# Patient Record
Sex: FEMALE | Race: WHITE | HISPANIC OR LATINO | URBAN - METROPOLITAN AREA
[De-identification: names, ages, dates, MRNs, and addresses within clinical notes are randomized per-mention and may not be internally consistent; named-entity substitution may affect disease eponyms.]

---

## 2018-04-19 ENCOUNTER — OUTPATIENT (OUTPATIENT)
Dept: OUTPATIENT SERVICES | Facility: HOSPITAL | Age: 53
LOS: 1 days | Discharge: HOME | End: 2018-04-19

## 2018-04-19 VITALS
RESPIRATION RATE: 16 BRPM | SYSTOLIC BLOOD PRESSURE: 141 MMHG | TEMPERATURE: 98 F | HEART RATE: 91 BPM | DIASTOLIC BLOOD PRESSURE: 70 MMHG | WEIGHT: 134.48 LBS | OXYGEN SATURATION: 96 %

## 2018-04-19 DIAGNOSIS — M20.11 HALLUX VALGUS (ACQUIRED), RIGHT FOOT: ICD-10-CM

## 2018-04-19 DIAGNOSIS — M21.6X1 OTHER ACQUIRED DEFORMITIES OF RIGHT FOOT: ICD-10-CM

## 2018-04-19 DIAGNOSIS — Z12.11 ENCOUNTER FOR SCREENING FOR MALIGNANT NEOPLASM OF COLON: Chronic | ICD-10-CM

## 2018-04-19 DIAGNOSIS — Z98.890 OTHER SPECIFIED POSTPROCEDURAL STATES: Chronic | ICD-10-CM

## 2018-04-19 DIAGNOSIS — Z01.818 ENCOUNTER FOR OTHER PREPROCEDURAL EXAMINATION: ICD-10-CM

## 2018-04-19 DIAGNOSIS — M21.969 UNSPECIFIED ACQUIRED DEFORMITY OF UNSPECIFIED LOWER LEG: ICD-10-CM

## 2018-04-19 DIAGNOSIS — I25.10 ATHEROSCLEROTIC HEART DISEASE OF NATIVE CORONARY ARTERY WITHOUT ANGINA PECTORIS: Chronic | ICD-10-CM

## 2018-04-19 DIAGNOSIS — Z30.2 ENCOUNTER FOR STERILIZATION: Chronic | ICD-10-CM

## 2018-04-19 DIAGNOSIS — Z87.891 PERSONAL HISTORY OF NICOTINE DEPENDENCE: ICD-10-CM

## 2018-04-19 LAB
ALBUMIN SERPL ELPH-MCNC: 4.9 G/DL — SIGNIFICANT CHANGE UP (ref 3.5–5.2)
ALP SERPL-CCNC: 60 U/L — SIGNIFICANT CHANGE UP (ref 30–115)
ALT FLD-CCNC: 16 U/L — SIGNIFICANT CHANGE UP (ref 0–41)
ANION GAP SERPL CALC-SCNC: 17 MMOL/L — HIGH (ref 7–14)
APPEARANCE UR: CLEAR — SIGNIFICANT CHANGE UP
APTT BLD: 30 SEC — SIGNIFICANT CHANGE UP (ref 27–39.2)
AST SERPL-CCNC: 21 U/L — SIGNIFICANT CHANGE UP (ref 0–41)
BASOPHILS # BLD AUTO: 0.06 K/UL — SIGNIFICANT CHANGE UP (ref 0–0.2)
BASOPHILS NFR BLD AUTO: 0.9 % — SIGNIFICANT CHANGE UP (ref 0–1)
BILIRUB SERPL-MCNC: 0.3 MG/DL — SIGNIFICANT CHANGE UP (ref 0.2–1.2)
BILIRUB UR-MCNC: NEGATIVE — SIGNIFICANT CHANGE UP
BUN SERPL-MCNC: 13 MG/DL — SIGNIFICANT CHANGE UP (ref 10–20)
CALCIUM SERPL-MCNC: 9.9 MG/DL — SIGNIFICANT CHANGE UP (ref 8.5–10.1)
CHLORIDE SERPL-SCNC: 103 MMOL/L — SIGNIFICANT CHANGE UP (ref 98–110)
CO2 SERPL-SCNC: 23 MMOL/L — SIGNIFICANT CHANGE UP (ref 17–32)
COLOR SPEC: YELLOW — SIGNIFICANT CHANGE UP
CREAT SERPL-MCNC: 0.8 MG/DL — SIGNIFICANT CHANGE UP (ref 0.7–1.5)
DIFF PNL FLD: NEGATIVE — SIGNIFICANT CHANGE UP
EOSINOPHIL # BLD AUTO: 0.03 K/UL — SIGNIFICANT CHANGE UP (ref 0–0.7)
EOSINOPHIL NFR BLD AUTO: 0.5 % — SIGNIFICANT CHANGE UP (ref 0–8)
GLUCOSE SERPL-MCNC: 91 MG/DL — SIGNIFICANT CHANGE UP (ref 70–99)
GLUCOSE UR QL: NEGATIVE MG/DL — SIGNIFICANT CHANGE UP
HCT VFR BLD CALC: 39.8 % — SIGNIFICANT CHANGE UP (ref 37–47)
HGB BLD-MCNC: 13.4 G/DL — SIGNIFICANT CHANGE UP (ref 12–16)
IMM GRANULOCYTES NFR BLD AUTO: 0.2 % — SIGNIFICANT CHANGE UP (ref 0.1–0.3)
INR BLD: 1.01 RATIO — SIGNIFICANT CHANGE UP (ref 0.65–1.3)
KETONES UR-MCNC: NEGATIVE — SIGNIFICANT CHANGE UP
LEUKOCYTE ESTERASE UR-ACNC: NEGATIVE — SIGNIFICANT CHANGE UP
LYMPHOCYTES # BLD AUTO: 2.05 K/UL — SIGNIFICANT CHANGE UP (ref 1.2–3.4)
LYMPHOCYTES # BLD AUTO: 31 % — SIGNIFICANT CHANGE UP (ref 20.5–51.1)
MCHC RBC-ENTMCNC: 28.2 PG — SIGNIFICANT CHANGE UP (ref 27–31)
MCHC RBC-ENTMCNC: 33.7 G/DL — SIGNIFICANT CHANGE UP (ref 32–37)
MCV RBC AUTO: 83.8 FL — SIGNIFICANT CHANGE UP (ref 81–99)
MONOCYTES # BLD AUTO: 0.54 K/UL — SIGNIFICANT CHANGE UP (ref 0.1–0.6)
MONOCYTES NFR BLD AUTO: 8.2 % — SIGNIFICANT CHANGE UP (ref 1.7–9.3)
NEUTROPHILS # BLD AUTO: 3.93 K/UL — SIGNIFICANT CHANGE UP (ref 1.4–6.5)
NEUTROPHILS NFR BLD AUTO: 59.2 % — SIGNIFICANT CHANGE UP (ref 42.2–75.2)
NITRITE UR-MCNC: NEGATIVE — SIGNIFICANT CHANGE UP
NRBC # BLD: 0 /100 WBCS — SIGNIFICANT CHANGE UP (ref 0–0)
PH UR: 6.5 — SIGNIFICANT CHANGE UP (ref 5–8)
PLATELET # BLD AUTO: 291 K/UL — SIGNIFICANT CHANGE UP (ref 130–400)
POTASSIUM SERPL-MCNC: 4.6 MMOL/L — SIGNIFICANT CHANGE UP (ref 3.5–5)
POTASSIUM SERPL-SCNC: 4.6 MMOL/L — SIGNIFICANT CHANGE UP (ref 3.5–5)
PROT SERPL-MCNC: 7.4 G/DL — SIGNIFICANT CHANGE UP (ref 6–8)
PROT UR-MCNC: NEGATIVE MG/DL — SIGNIFICANT CHANGE UP
PROTHROM AB SERPL-ACNC: 10.9 SEC — SIGNIFICANT CHANGE UP (ref 9.95–12.87)
RBC # BLD: 4.75 M/UL — SIGNIFICANT CHANGE UP (ref 4.2–5.4)
RBC # FLD: 12.9 % — SIGNIFICANT CHANGE UP (ref 11.5–14.5)
SODIUM SERPL-SCNC: 143 MMOL/L — SIGNIFICANT CHANGE UP (ref 135–146)
SP GR SPEC: 1.01 — SIGNIFICANT CHANGE UP (ref 1.01–1.03)
UROBILINOGEN FLD QL: 0.2 MG/DL — SIGNIFICANT CHANGE UP (ref 0.2–0.2)
WBC # BLD: 6.62 K/UL — SIGNIFICANT CHANGE UP (ref 4.8–10.8)
WBC # FLD AUTO: 6.62 K/UL — SIGNIFICANT CHANGE UP (ref 4.8–10.8)

## 2018-04-19 NOTE — H&P PST ADULT - NSANTHOSAYNRD_GEN_A_CORE
No. SRIRAM screening performed.  STOP BANG Legend: 0-2 = LOW Risk; 3-4 = INTERMEDIATE Risk; 5-8 = HIGH Risk

## 2018-04-19 NOTE — H&P PST ADULT - REASON FOR ADMISSION
52 year old female here for akin bunionectomy right foot, pratik osteotomy with fixation right foot, keilas bunionectomy right foot with fixation, pt has had pain in right foot for approx 10 years, same is getting worse over the last 3 months, pain is increasing at the end of the day, affecting adl's, needs procedure  pt denies cp, sob, garcia or palpitations  pt denies urinary frequency, urgency or burning  ex dena 3 fos or more

## 2018-04-19 NOTE — H&P PST ADULT - PSH
Encounter for screening colonoscopy    Encounter for tubal ligation    H/O varicose vein stripping    History of total abdominal hysterectomy

## 2018-04-19 NOTE — H&P PST ADULT - FAMILY HISTORY
Father  Still living? Unknown  CAD (coronary artery disease), Age at diagnosis: Age Unknown     Mother  Still living? Unknown  CAD (coronary artery disease), Age at diagnosis: Age Unknown     Sibling  Still living? Yes, Estimated age: Age Unknown  CAD (coronary artery disease), Age at diagnosis: 51-60 Father  Still living? Unknown  CAD (coronary artery disease), Age at diagnosis: 61-70     Mother  Still living? No  CAD (coronary artery disease), Age at diagnosis: 51-60     Sibling  Still living? Yes, Estimated age: 61-70  CAD (coronary artery disease), Age at diagnosis: 51-60 Father  Still living? No  CAD (coronary artery disease), Age at diagnosis: 61-70     Mother  Still living? No  CAD (coronary artery disease), Age at diagnosis: 51-60     Sibling  Still living? Yes, Estimated age: 61-70  CAD (coronary artery disease), Age at diagnosis: 51-60

## 2018-04-26 ENCOUNTER — OUTPATIENT (OUTPATIENT)
Dept: OUTPATIENT SERVICES | Facility: HOSPITAL | Age: 53
LOS: 1 days | Discharge: HOME | End: 2018-04-26

## 2018-04-26 DIAGNOSIS — Z98.890 OTHER SPECIFIED POSTPROCEDURAL STATES: Chronic | ICD-10-CM

## 2018-04-26 DIAGNOSIS — Z30.2 ENCOUNTER FOR STERILIZATION: Chronic | ICD-10-CM

## 2018-04-26 DIAGNOSIS — Z12.11 ENCOUNTER FOR SCREENING FOR MALIGNANT NEOPLASM OF COLON: Chronic | ICD-10-CM

## 2018-04-26 DIAGNOSIS — R05 COUGH: ICD-10-CM

## 2018-04-26 NOTE — ASU PATIENT PROFILE, ADULT - PSH
Encounter for screening colonoscopy    Encounter for tubal ligation    H/O varicose vein stripping    History of bladder surgery  bladder lift  History of total abdominal hysterectomy

## 2018-04-27 ENCOUNTER — OUTPATIENT (OUTPATIENT)
Dept: OUTPATIENT SERVICES | Facility: HOSPITAL | Age: 53
LOS: 1 days | Discharge: HOME | End: 2018-04-27

## 2018-04-27 ENCOUNTER — RESULT REVIEW (OUTPATIENT)
Age: 53
End: 2018-04-27

## 2018-04-27 VITALS
WEIGHT: 134.92 LBS | HEIGHT: 62 IN | TEMPERATURE: 96 F | SYSTOLIC BLOOD PRESSURE: 111 MMHG | DIASTOLIC BLOOD PRESSURE: 72 MMHG | RESPIRATION RATE: 15 BRPM | OXYGEN SATURATION: 98 % | HEART RATE: 72 BPM

## 2018-04-27 VITALS — HEART RATE: 67 BPM | SYSTOLIC BLOOD PRESSURE: 110 MMHG | DIASTOLIC BLOOD PRESSURE: 67 MMHG | RESPIRATION RATE: 18 BRPM

## 2018-04-27 DIAGNOSIS — Z98.890 OTHER SPECIFIED POSTPROCEDURAL STATES: Chronic | ICD-10-CM

## 2018-04-27 DIAGNOSIS — Z12.11 ENCOUNTER FOR SCREENING FOR MALIGNANT NEOPLASM OF COLON: Chronic | ICD-10-CM

## 2018-04-27 DIAGNOSIS — Z30.2 ENCOUNTER FOR STERILIZATION: Chronic | ICD-10-CM

## 2018-04-27 RX ORDER — SODIUM CHLORIDE 9 MG/ML
1000 INJECTION, SOLUTION INTRAVENOUS
Qty: 0 | Refills: 0 | Status: DISCONTINUED | OUTPATIENT
Start: 2018-04-27 | End: 2018-05-12

## 2018-04-27 RX ORDER — MORPHINE SULFATE 50 MG/1
2 CAPSULE, EXTENDED RELEASE ORAL
Qty: 0 | Refills: 0 | Status: DISCONTINUED | OUTPATIENT
Start: 2018-04-27 | End: 2018-04-27

## 2018-04-27 RX ORDER — OXYCODONE AND ACETAMINOPHEN 5; 325 MG/1; MG/1
1 TABLET ORAL EVERY 4 HOURS
Qty: 0 | Refills: 0 | Status: DISCONTINUED | OUTPATIENT
Start: 2018-04-27 | End: 2018-04-27

## 2018-04-27 RX ORDER — ONDANSETRON 8 MG/1
4 TABLET, FILM COATED ORAL ONCE
Qty: 0 | Refills: 0 | Status: DISCONTINUED | OUTPATIENT
Start: 2018-04-27 | End: 2018-05-12

## 2018-04-27 RX ADMIN — SODIUM CHLORIDE 100 MILLILITER(S): 9 INJECTION, SOLUTION INTRAVENOUS at 16:58

## 2018-04-27 NOTE — PROGRESS NOTE ADULT - SUBJECTIVE AND OBJECTIVE BOX
Podiatry Pre-Operative Note     [id number] CLARITZA ROTH is a 52y year old Female patient presenting to the operating room for surgical management of the Right foot. The patient has failed all conservative measures and requires surgical intervention at this time.    PAST MEDICAL & SURGICAL HISTORY:  Bunion of right foot  History of bladder surgery: bladder lift  Encounter for screening colonoscopy  Encounter for tubal ligation  History of total abdominal hysterectomy  H/O varicose vein stripping    Medications:   Denies    Allergies  No Known Allergies    Consent [Done]  H&P [Done PAST]  Medical Clearance [PAST]    T(C): 35.6 (04-27-18 @ 12:36), Max: 35.6 (04-27-18 @ 12:36)  HR: 72 (04-27-18 @ 12:36) (72 - 72)  BP: 111/72 (04-27-18 @ 12:36) (111/72 - 111/72)  RR: 15 (04-27-18 @ 12:36) (15 - 15)  SpO2: 98% (04-27-18 @ 12:36) (98% - 98%)    Surgeon: Dr. Rodney Fletcher DPM  Assistant (s): Dr. Cristian Graham DPM  Pre Operative Diagnosis: Painful Right foot bunion and Right foot bunionette   Planned Procedure: Right foot Danny bunionectomy Right 1st toe Akin osteotmy, Right foot bunionectomy with internal fixation     The patient has been educated on the above procedure, with all risks and benefits described. No guarantees were given or implied for the aforementioned procedure.  The above assistant(s) have introduced themselves to the patient. The patient consents to their participation in the procedure listed above.   04-27-18 @ 13:12

## 2018-04-27 NOTE — BRIEF OPERATIVE NOTE - PROCEDURE
<<-----Click on this checkbox to enter Procedure Osteotomy  04/27/2018  Homman osteotomy Right 5th toe 0.45 kwire left  Active  TCONTRERAS  Akin bunionectomy of right great toe  04/27/2018    Active  TCONTRERAS  Danny bunionectomy of right great toe  04/27/2018  K wire .62 left in place  Active  TCONTRERAS

## 2018-04-27 NOTE — BRIEF OPERATIVE NOTE - POST-OP DX
Bunion of right foot  04/27/2018    Active  Virgie Flores  Bunionette of right foot  04/27/2018    Active  Virgie Flores  Hallux interphalangeus, acquired, right  04/27/2018  primus elevatus , medially deviated  Active  Virgie Flores

## 2018-04-27 NOTE — ASU DISCHARGE PLAN (ADULT/PEDIATRIC). - NOTIFY
Pain not relieved by Medications/Swelling that continues/Numbness, color, or temperature change to extremity/Fever greater than 101/Numbness, tingling/Bleeding that does not stop

## 2018-05-03 DIAGNOSIS — M21.611 BUNION OF RIGHT FOOT: ICD-10-CM

## 2018-05-03 DIAGNOSIS — M21.621 BUNIONETTE OF RIGHT FOOT: ICD-10-CM

## 2018-05-03 DIAGNOSIS — F17.210 NICOTINE DEPENDENCE, CIGARETTES, UNCOMPLICATED: ICD-10-CM

## 2018-05-03 DIAGNOSIS — Z82.49 FAMILY HISTORY OF ISCHEMIC HEART DISEASE AND OTHER DISEASES OF THE CIRCULATORY SYSTEM: ICD-10-CM

## 2018-05-03 DIAGNOSIS — Z90.710 ACQUIRED ABSENCE OF BOTH CERVIX AND UTERUS: ICD-10-CM

## 2018-05-03 LAB — SURGICAL PATHOLOGY STUDY: SIGNIFICANT CHANGE UP

## 2018-07-20 PROBLEM — M21.611 BUNION OF RIGHT FOOT: Chronic | Status: ACTIVE | Noted: 2018-04-19

## 2018-07-26 ENCOUNTER — OUTPATIENT (OUTPATIENT)
Dept: OUTPATIENT SERVICES | Facility: HOSPITAL | Age: 53
LOS: 1 days | Discharge: HOME | End: 2018-07-26

## 2018-07-26 DIAGNOSIS — Z98.890 OTHER SPECIFIED POSTPROCEDURAL STATES: Chronic | ICD-10-CM

## 2018-07-26 DIAGNOSIS — I25.89 OTHER FORMS OF CHRONIC ISCHEMIC HEART DISEASE: ICD-10-CM

## 2018-07-26 DIAGNOSIS — Z12.11 ENCOUNTER FOR SCREENING FOR MALIGNANT NEOPLASM OF COLON: Chronic | ICD-10-CM

## 2018-07-26 DIAGNOSIS — Z30.2 ENCOUNTER FOR STERILIZATION: Chronic | ICD-10-CM

## 2019-09-27 ENCOUNTER — OUTPATIENT (OUTPATIENT)
Dept: OUTPATIENT SERVICES | Facility: HOSPITAL | Age: 54
LOS: 1 days | Discharge: HOME | End: 2019-09-27
Payer: COMMERCIAL

## 2019-09-27 VITALS
HEIGHT: 62 IN | WEIGHT: 139.99 LBS | DIASTOLIC BLOOD PRESSURE: 83 MMHG | RESPIRATION RATE: 18 BRPM | SYSTOLIC BLOOD PRESSURE: 140 MMHG | HEART RATE: 88 BPM | TEMPERATURE: 98 F

## 2019-09-27 DIAGNOSIS — L02.611 CUTANEOUS ABSCESS OF RIGHT FOOT: ICD-10-CM

## 2019-09-27 DIAGNOSIS — M20.12 HALLUX VALGUS (ACQUIRED), LEFT FOOT: ICD-10-CM

## 2019-09-27 DIAGNOSIS — Z98.890 OTHER SPECIFIED POSTPROCEDURAL STATES: Chronic | ICD-10-CM

## 2019-09-27 DIAGNOSIS — Z30.2 ENCOUNTER FOR STERILIZATION: Chronic | ICD-10-CM

## 2019-09-27 DIAGNOSIS — Z12.11 ENCOUNTER FOR SCREENING FOR MALIGNANT NEOPLASM OF COLON: Chronic | ICD-10-CM

## 2019-09-27 DIAGNOSIS — Z01.818 ENCOUNTER FOR OTHER PREPROCEDURAL EXAMINATION: ICD-10-CM

## 2019-09-27 DIAGNOSIS — T85.79XA INFECTION AND INFLAMMATORY REACTION DUE TO OTHER INTERNAL PROSTHETIC DEVICES, IMPLANTS AND GRAFTS, INITIAL ENCOUNTER: ICD-10-CM

## 2019-09-27 LAB
ALBUMIN SERPL ELPH-MCNC: 5.2 G/DL — SIGNIFICANT CHANGE UP (ref 3.5–5.2)
ALP SERPL-CCNC: 68 U/L — SIGNIFICANT CHANGE UP (ref 30–115)
ALT FLD-CCNC: 16 U/L — SIGNIFICANT CHANGE UP (ref 0–41)
ANION GAP SERPL CALC-SCNC: 13 MMOL/L — SIGNIFICANT CHANGE UP (ref 7–14)
APPEARANCE UR: CLEAR — SIGNIFICANT CHANGE UP
APTT BLD: 35.4 SEC — SIGNIFICANT CHANGE UP (ref 27–39.2)
AST SERPL-CCNC: 22 U/L — SIGNIFICANT CHANGE UP (ref 0–41)
BASOPHILS # BLD AUTO: 0.06 K/UL — SIGNIFICANT CHANGE UP (ref 0–0.2)
BASOPHILS NFR BLD AUTO: 0.9 % — SIGNIFICANT CHANGE UP (ref 0–1)
BILIRUB SERPL-MCNC: 0.3 MG/DL — SIGNIFICANT CHANGE UP (ref 0.2–1.2)
BILIRUB UR-MCNC: NEGATIVE — SIGNIFICANT CHANGE UP
BUN SERPL-MCNC: 16 MG/DL — SIGNIFICANT CHANGE UP (ref 10–20)
CALCIUM SERPL-MCNC: 9.8 MG/DL — SIGNIFICANT CHANGE UP (ref 8.5–10.1)
CHLORIDE SERPL-SCNC: 103 MMOL/L — SIGNIFICANT CHANGE UP (ref 98–110)
CO2 SERPL-SCNC: 27 MMOL/L — SIGNIFICANT CHANGE UP (ref 17–32)
COLOR SPEC: SIGNIFICANT CHANGE UP
CREAT SERPL-MCNC: 0.8 MG/DL — SIGNIFICANT CHANGE UP (ref 0.7–1.5)
DIFF PNL FLD: NEGATIVE — SIGNIFICANT CHANGE UP
EOSINOPHIL # BLD AUTO: 0.05 K/UL — SIGNIFICANT CHANGE UP (ref 0–0.7)
EOSINOPHIL NFR BLD AUTO: 0.8 % — SIGNIFICANT CHANGE UP (ref 0–8)
GLUCOSE SERPL-MCNC: 85 MG/DL — SIGNIFICANT CHANGE UP (ref 70–99)
GLUCOSE UR QL: NEGATIVE — SIGNIFICANT CHANGE UP
HCT VFR BLD CALC: 43.3 % — SIGNIFICANT CHANGE UP (ref 37–47)
HGB BLD-MCNC: 13.8 G/DL — SIGNIFICANT CHANGE UP (ref 12–16)
IMM GRANULOCYTES NFR BLD AUTO: 0.2 % — SIGNIFICANT CHANGE UP (ref 0.1–0.3)
INR BLD: 0.91 RATIO — SIGNIFICANT CHANGE UP (ref 0.65–1.3)
KETONES UR-MCNC: NEGATIVE — SIGNIFICANT CHANGE UP
LEUKOCYTE ESTERASE UR-ACNC: NEGATIVE — SIGNIFICANT CHANGE UP
LYMPHOCYTES # BLD AUTO: 2.15 K/UL — SIGNIFICANT CHANGE UP (ref 1.2–3.4)
LYMPHOCYTES # BLD AUTO: 32.8 % — SIGNIFICANT CHANGE UP (ref 20.5–51.1)
MCHC RBC-ENTMCNC: 28.3 PG — SIGNIFICANT CHANGE UP (ref 27–31)
MCHC RBC-ENTMCNC: 31.9 G/DL — LOW (ref 32–37)
MCV RBC AUTO: 88.9 FL — SIGNIFICANT CHANGE UP (ref 81–99)
MONOCYTES # BLD AUTO: 0.57 K/UL — SIGNIFICANT CHANGE UP (ref 0.1–0.6)
MONOCYTES NFR BLD AUTO: 8.7 % — SIGNIFICANT CHANGE UP (ref 1.7–9.3)
NEUTROPHILS # BLD AUTO: 3.71 K/UL — SIGNIFICANT CHANGE UP (ref 1.4–6.5)
NEUTROPHILS NFR BLD AUTO: 56.6 % — SIGNIFICANT CHANGE UP (ref 42.2–75.2)
NITRITE UR-MCNC: NEGATIVE — SIGNIFICANT CHANGE UP
NRBC # BLD: 0 /100 WBCS — SIGNIFICANT CHANGE UP (ref 0–0)
PH UR: 7.5 — SIGNIFICANT CHANGE UP (ref 5–8)
PLATELET # BLD AUTO: 289 K/UL — SIGNIFICANT CHANGE UP (ref 130–400)
POTASSIUM SERPL-MCNC: 4.3 MMOL/L — SIGNIFICANT CHANGE UP (ref 3.5–5)
POTASSIUM SERPL-SCNC: 4.3 MMOL/L — SIGNIFICANT CHANGE UP (ref 3.5–5)
PROT SERPL-MCNC: 7.8 G/DL — SIGNIFICANT CHANGE UP (ref 6–8)
PROT UR-MCNC: NEGATIVE — SIGNIFICANT CHANGE UP
PROTHROM AB SERPL-ACNC: 10.5 SEC — SIGNIFICANT CHANGE UP (ref 9.95–12.87)
RBC # BLD: 4.87 M/UL — SIGNIFICANT CHANGE UP (ref 4.2–5.4)
RBC # FLD: 12.8 % — SIGNIFICANT CHANGE UP (ref 11.5–14.5)
SODIUM SERPL-SCNC: 143 MMOL/L — SIGNIFICANT CHANGE UP (ref 135–146)
SP GR SPEC: 1.01 — LOW (ref 1.01–1.02)
UROBILINOGEN FLD QL: SIGNIFICANT CHANGE UP
WBC # BLD: 6.55 K/UL — SIGNIFICANT CHANGE UP (ref 4.8–10.8)
WBC # FLD AUTO: 6.55 K/UL — SIGNIFICANT CHANGE UP (ref 4.8–10.8)

## 2019-09-27 PROCEDURE — 93010 ELECTROCARDIOGRAM REPORT: CPT

## 2019-09-27 PROCEDURE — 71046 X-RAY EXAM CHEST 2 VIEWS: CPT | Mod: 26

## 2019-09-27 NOTE — H&P PST ADULT - NSICDXFAMILYHX_GEN_ALL_CORE_FT
FAMILY HISTORY:  Father  Still living? No  CAD (coronary artery disease), Age at diagnosis: 61-70    Mother  Still living? No  CAD (coronary artery disease), Age at diagnosis: 51-60    Sibling  Still living? Yes, Estimated age: 61-70  CAD (coronary artery disease), Age at diagnosis: 51-60

## 2019-09-27 NOTE — H&P PST ADULT - HISTORY OF PRESENT ILLNESS
Patient presents with c/o progressively worsening left bunion pain and right ORIF site pain. Patient denies any cp, sob, palpitations fever, cough or dysuria. Ex tolerance of 5 FOS walk with out SOB. No SRIRAM.

## 2019-09-27 NOTE — H&P PST ADULT - NSICDXPASTSURGICALHX_GEN_ALL_CORE_FT
PAST SURGICAL HISTORY:  Encounter for screening colonoscopy     Encounter for tubal ligation     H/O varicose vein stripping     History of bladder surgery bladder lift    History of total abdominal hysterectomy

## 2019-09-29 LAB
CULTURE RESULTS: SIGNIFICANT CHANGE UP
SPECIMEN SOURCE: SIGNIFICANT CHANGE UP

## 2019-10-01 PROBLEM — M19.90 UNSPECIFIED OSTEOARTHRITIS, UNSPECIFIED SITE: Chronic | Status: ACTIVE | Noted: 2019-09-27

## 2019-10-10 NOTE — ASU PATIENT PROFILE, ADULT - PSH
Encounter for screening colonoscopy  2015  Encounter for tubal ligation    H/O varicose vein stripping    History of bladder surgery  bladder lift  History of total abdominal hysterectomy

## 2019-10-11 ENCOUNTER — RESULT REVIEW (OUTPATIENT)
Age: 54
End: 2019-10-11

## 2019-10-11 ENCOUNTER — OUTPATIENT (OUTPATIENT)
Dept: OUTPATIENT SERVICES | Facility: HOSPITAL | Age: 54
LOS: 1 days | Discharge: HOME | End: 2019-10-11
Payer: COMMERCIAL

## 2019-10-11 VITALS — DIASTOLIC BLOOD PRESSURE: 64 MMHG | RESPIRATION RATE: 18 BRPM | SYSTOLIC BLOOD PRESSURE: 134 MMHG | HEART RATE: 64 BPM

## 2019-10-11 VITALS
RESPIRATION RATE: 18 BRPM | SYSTOLIC BLOOD PRESSURE: 118 MMHG | HEIGHT: 62 IN | DIASTOLIC BLOOD PRESSURE: 61 MMHG | OXYGEN SATURATION: 97 % | HEART RATE: 70 BPM | WEIGHT: 138.01 LBS | TEMPERATURE: 97 F

## 2019-10-11 DIAGNOSIS — Z30.2 ENCOUNTER FOR STERILIZATION: Chronic | ICD-10-CM

## 2019-10-11 DIAGNOSIS — Z12.11 ENCOUNTER FOR SCREENING FOR MALIGNANT NEOPLASM OF COLON: Chronic | ICD-10-CM

## 2019-10-11 DIAGNOSIS — M19.90 UNSPECIFIED OSTEOARTHRITIS, UNSPECIFIED SITE: ICD-10-CM

## 2019-10-11 DIAGNOSIS — M20.12 HALLUX VALGUS (ACQUIRED), LEFT FOOT: ICD-10-CM

## 2019-10-11 DIAGNOSIS — Z98.890 OTHER SPECIFIED POSTPROCEDURAL STATES: Chronic | ICD-10-CM

## 2019-10-11 DIAGNOSIS — L03.031 CELLULITIS OF RIGHT TOE: ICD-10-CM

## 2019-10-11 PROCEDURE — 88311 DECALCIFY TISSUE: CPT | Mod: 26

## 2019-10-11 PROCEDURE — 88300 SURGICAL PATH GROSS: CPT | Mod: 26,59

## 2019-10-11 PROCEDURE — 88304 TISSUE EXAM BY PATHOLOGIST: CPT | Mod: 26

## 2019-10-11 RX ORDER — OXYCODONE AND ACETAMINOPHEN 5; 325 MG/1; MG/1
1 TABLET ORAL EVERY 4 HOURS
Refills: 0 | Status: DISCONTINUED | OUTPATIENT
Start: 2019-10-11 | End: 2019-10-11

## 2019-10-11 RX ORDER — SODIUM CHLORIDE 9 MG/ML
1000 INJECTION, SOLUTION INTRAVENOUS
Refills: 0 | Status: DISCONTINUED | OUTPATIENT
Start: 2019-10-11 | End: 2019-10-28

## 2019-10-11 RX ORDER — ONDANSETRON 8 MG/1
4 TABLET, FILM COATED ORAL ONCE
Refills: 0 | Status: DISCONTINUED | OUTPATIENT
Start: 2019-10-11 | End: 2019-10-28

## 2019-10-11 RX ORDER — ACETAMINOPHEN 500 MG
650 TABLET ORAL ONCE
Refills: 0 | Status: DISCONTINUED | OUTPATIENT
Start: 2019-10-11 | End: 2019-10-28

## 2019-10-11 RX ORDER — MORPHINE SULFATE 50 MG/1
2 CAPSULE, EXTENDED RELEASE ORAL
Refills: 0 | Status: DISCONTINUED | OUTPATIENT
Start: 2019-10-11 | End: 2019-10-11

## 2019-10-11 RX ADMIN — SODIUM CHLORIDE 100 MILLILITER(S): 9 INJECTION, SOLUTION INTRAVENOUS at 16:33

## 2019-10-11 NOTE — ASU DISCHARGE PLAN (ADULT/PEDIATRIC) - CALL YOUR DOCTOR IF YOU HAVE ANY OF THE FOLLOWING:
Fever greater than (need to indicate Fahrenheit or Celsius)/Wound/Surgical Site with redness, or foul smelling discharge or pus/Excessive diarrhea/Swelling that gets worse/Increased irritability or sluggishness/100.4/Nausea and vomiting that does not stop/Unable to urinate/Inability to tolerate liquids or foods/Bleeding that does not stop/Pain not relieved by Medications/Numbness, tingling, color or temperature change to extremity

## 2019-10-11 NOTE — ASU DISCHARGE PLAN (ADULT/PEDIATRIC) - PROCEDURE
Danny bunionectomy and proximal Moises left foot. Removel of hardware and partial matrixectomy medial border right hallux nail right foot

## 2019-10-11 NOTE — ASU PREOP CHECKLIST - SKIN PREP
Pt presents to ED c/o HA, n/v, and severe abd cramping since Friday.  States she took zofran, but it isn't helping.  Denies sob, chest pains.  Also states she thinks she may be peeing blood, but not sure.     n/a

## 2019-10-11 NOTE — BRIEF OPERATIVE NOTE - NSICDXBRIEFPROCEDURE_GEN_ALL_CORE_FT
PROCEDURES:  Removal of hardware from metatarsal bone of right foot 11-Oct-2019 16:16:58  Perfecto Quinonez  Moises osteotomy 11-Oct-2019 16:16:39  Perfecto Quinonez bunionectomy 11-Oct-2019 16:16:24  Perfecto Quinonez

## 2019-10-15 LAB — SURGICAL PATHOLOGY STUDY: SIGNIFICANT CHANGE UP

## 2020-06-26 PROBLEM — G43.909 MIGRAINE, UNSPECIFIED, NOT INTRACTABLE, WITHOUT STATUS MIGRAINOSUS: Chronic | Status: ACTIVE | Noted: 2019-10-11

## 2020-07-22 PROBLEM — Z00.00 ENCOUNTER FOR PREVENTIVE HEALTH EXAMINATION: Status: ACTIVE | Noted: 2020-07-22

## 2020-07-24 ENCOUNTER — RESULT CHARGE (OUTPATIENT)
Age: 55
End: 2020-07-24

## 2020-07-24 ENCOUNTER — OUTPATIENT (OUTPATIENT)
Dept: OUTPATIENT SERVICES | Facility: HOSPITAL | Age: 55
LOS: 1 days | Discharge: HOME | End: 2020-07-24

## 2020-07-24 ENCOUNTER — APPOINTMENT (OUTPATIENT)
Dept: UROGYNECOLOGY | Facility: CLINIC | Age: 55
End: 2020-07-24
Payer: COMMERCIAL

## 2020-07-24 VITALS
BODY MASS INDEX: 26.68 KG/M2 | HEIGHT: 62 IN | DIASTOLIC BLOOD PRESSURE: 78 MMHG | WEIGHT: 145 LBS | SYSTOLIC BLOOD PRESSURE: 126 MMHG

## 2020-07-24 DIAGNOSIS — Z30.2 ENCOUNTER FOR STERILIZATION: Chronic | ICD-10-CM

## 2020-07-24 DIAGNOSIS — Z87.440 PERSONAL HISTORY OF URINARY (TRACT) INFECTIONS: ICD-10-CM

## 2020-07-24 DIAGNOSIS — Z78.9 OTHER SPECIFIED HEALTH STATUS: ICD-10-CM

## 2020-07-24 DIAGNOSIS — Z82.49 FAMILY HISTORY OF ISCHEMIC HEART DISEASE AND OTHER DISEASES OF THE CIRCULATORY SYSTEM: ICD-10-CM

## 2020-07-24 DIAGNOSIS — Z12.11 ENCOUNTER FOR SCREENING FOR MALIGNANT NEOPLASM OF COLON: Chronic | ICD-10-CM

## 2020-07-24 DIAGNOSIS — Z98.890 OTHER SPECIFIED POSTPROCEDURAL STATES: Chronic | ICD-10-CM

## 2020-07-24 DIAGNOSIS — F17.200 NICOTINE DEPENDENCE, UNSPECIFIED, UNCOMPLICATED: ICD-10-CM

## 2020-07-24 LAB
BILIRUB UR QL STRIP: NORMAL
CLARITY UR: CLEAR
COLLECTION METHOD: NORMAL
GLUCOSE UR-MCNC: NORMAL
HCG UR QL: 0.2 EU/DL
HGB UR QL STRIP.AUTO: NORMAL
KETONES UR-MCNC: NORMAL
LEUKOCYTE ESTERASE UR QL STRIP: NORMAL
NITRITE UR QL STRIP: NORMAL
PH UR STRIP: 6
PROT UR STRIP-MCNC: NORMAL
SP GR UR STRIP: 1

## 2020-07-24 PROCEDURE — 99244 OFF/OP CNSLTJ NEW/EST MOD 40: CPT | Mod: 25

## 2020-07-24 PROCEDURE — 51701 INSERT BLADDER CATHETER: CPT

## 2020-07-27 LAB
APPEARANCE: CLEAR
BACTERIA UR CULT: NORMAL
BILIRUBIN URINE: NEGATIVE
BLOOD URINE: NEGATIVE
COLOR: COLORLESS
GLUCOSE QUALITATIVE U: NEGATIVE
KETONES URINE: NEGATIVE
LEUKOCYTE ESTERASE URINE: NEGATIVE
NITRITE URINE: NEGATIVE
PH URINE: 6.5
PROTEIN URINE: NEGATIVE
SPECIFIC GRAVITY URINE: 1
UROBILINOGEN URINE: NORMAL

## 2020-07-29 DIAGNOSIS — N39.46 MIXED INCONTINENCE: ICD-10-CM

## 2020-07-29 DIAGNOSIS — Z87.440 PERSONAL HISTORY OF URINARY (TRACT) INFECTIONS: ICD-10-CM

## 2020-07-29 DIAGNOSIS — N95.2 POSTMENOPAUSAL ATROPHIC VAGINITIS: ICD-10-CM

## 2020-08-21 NOTE — HISTORY OF PRESENT ILLNESS
[FreeTextEntry1] : \par Pt with pelvic floor dysfunction here for urogynecologic evaluation. She describes: \par Referring provider: Dr Maldonado\par \par Chief PFD: urinary incontinence\par \par Reports symptoms of frequency, dysuria, intermittent hematuria, has a urine dip, no cultures, last time was 3 months ago, reports 4 episodes in the last 12 months\par \par Operative report reviewed:\par 12/27/16 Dr Weaver: anterior colporrhaphy (but is not described in the procedure paragraph)/TVT/cysto with hydrodistention (tensioned with crede and full bladder)\par 1/17/17 Dr Weaver: revision of anterior colporrhaphy (but is not described in the procedure paragraph)/transection of sling,urethrolysis\par \par Pelvic organ prolapse: s/p tubal, s/p laparoscopic hysterectomy(endometriosis) 2015, no bulge, denies splinting\par Stress urinary incontinence: s/p sling, postop guerra for 3 weeks, Dr Miner, s/p ? transection, currently incontinence with coughing, sneezing, laughing, for 4 years, FOREST: S=U\par Overactive bladder syndrome: voids q1 hour secondary to pain that improves with urination, urgency, no eneuresis, urge incontinence daily, for 4 years, no prior treatment, no glaucoma\par Voiding dysfunction: no Incomplete bladder emptying, no hesitancy \par Lower urinary tract/vaginal symptoms: as above UTIs per year, as above hematuria, no dysuria, as above bladder pain \par Fecal incontinence: denies\par Defecatory dysfunction: sausage\par Sexual dysfunction: pain at the opening for the last couple of years\par Pelvic pain: denies\par Vaginal dryness : denies\par \par Her pelvic floor symptoms are significantly bothersome and negatively impacting her quality of life. \par \par

## 2020-08-21 NOTE — COUNSELING
[FreeTextEntry1] : \par We will notify you of the urine results if they are abnormal\par \par Please call the office if you feel like you have an infection so that we can arrange testing of your urine. If you keep getting infections then I will recommend further evaluation of your kidneys\par \par Please call the pharmacy to discuss returning the myrbetriq. Please let us know what they say. If you can't return it, then please start the myrbetriq daily. \par \par Apply a pea size amount of the cream to the opening of the vagina every night for two weeks followed by three nights per week\par \par Please call my office if you have any issues with the cost or side effects of the medication.\par \par Please schedule a cysto with Dr Ramirez in 4-6 weeks (FOREST). Please come with a full bladder\par \par Please schedule a pessary fitting with my PALedy\par \par Referral to pelvic floor PT\par \par [facility]\par [address] [place] [address] [address] [facility] [id number]\par \par \par Please sign a medical release form at the  so that I can get your operative reports from [facility]\par \par \par

## 2020-08-21 NOTE — DISCUSSION/SUMMARY
[FreeTextEntry1] : \par Mixed incontinence-\par The pathophysiology of the above condition was discussed with the patient. The patient was given information and education on pelvic floor muscle exercises/rehabilitation, avoidance of dietary bladder irritants, and other strategies to improve bladder control, such as scheduled voiding. She was counseled regarding further management strategies for overactive bladder and urge incontinence including pelvic floor physical therapy, medications or surgical management. The patient voiced understanding and agrees with diet changes, referral to PT, further evaluation for foreign body with a cysto and medical management. The patient already picked up the myrbetriq 50mg (prescribed by other provider). It cost 100 dollars. She will try to return it but if they won't accept it she will use it for the month (30 day supply) and then switch to trospium 20mg bid. We will follow up on her urine tests.\par \par he management options for stress incontinence were discussed including observation, pessary placement, pelvic floor physical therapy or surgery. The patient voiced understanding and agrees with a referral to PT and pessary management. She will be scheduled for a pessary fitting.\par \par History of UTI-\par Advised the patient that recurrent UTIs are defined as having 3 or more positive urine culture in 1 year or 2 or more in 6 months, which she has not had. Advised to call the office if she feels like she has an infection so that we can arrange testing of her urine. If she keeps getting infections then I will recommend a workup of further evaluation of her kidneys and bladder and further prevention treatment including estrogen vaginal cream and daily antibiotic suppression. The patient voiced understanding and agrees with the plan.\par \par Atrophic vaginitis-\par We reviewed the risks, benefits, alternatives and indications of local estrogen therapy and I gave her a handout that covers this information. She does opt to begin this therapy. I have given her a prescription and specific instructions on how to use the estrogen cream, applied with a finger at a low dose for urogenital atrophy.\par \par \par

## 2020-08-21 NOTE — PHYSICAL EXAM
[Chaperone Present] : A chaperone was present in the examining room during all aspects of the physical examination [FreeTextEntry1] : Void: 350 cc\par PVR: 110 cc (normal PVR for this volume voided is 153cc or less)\par Urethra was prepped in sterile fashion and then a sterile catheter was used by me to drain the bladder.\par \par  Ap: -1  Bp: -1, with hard stools\par \par Well healed incision: laparoscopic \par normal perineal sensation\par normal perineal reflexes\par positive cough stress test\par positive atrophy\par positive urethral hypermobility\par bilateral levator ani spasm,  no tenderness\par no urethral tenderness\par no bladder tenderness\par no cuff tenderness\par surgically absent uterus and cervix\par No Mesh exposure or tenderness \par 1/5 Kegel\par

## 2020-09-04 ENCOUNTER — APPOINTMENT (OUTPATIENT)
Dept: UROGYNECOLOGY | Facility: CLINIC | Age: 55
End: 2020-09-04
Payer: COMMERCIAL

## 2020-09-04 ENCOUNTER — OUTPATIENT (OUTPATIENT)
Dept: OUTPATIENT SERVICES | Facility: HOSPITAL | Age: 55
LOS: 1 days | Discharge: HOME | End: 2020-09-04

## 2020-09-04 VITALS — DIASTOLIC BLOOD PRESSURE: 70 MMHG | SYSTOLIC BLOOD PRESSURE: 130 MMHG

## 2020-09-04 DIAGNOSIS — Z30.2 ENCOUNTER FOR STERILIZATION: Chronic | ICD-10-CM

## 2020-09-04 DIAGNOSIS — Z98.890 OTHER SPECIFIED POSTPROCEDURAL STATES: Chronic | ICD-10-CM

## 2020-09-04 DIAGNOSIS — Z12.11 ENCOUNTER FOR SCREENING FOR MALIGNANT NEOPLASM OF COLON: Chronic | ICD-10-CM

## 2020-09-04 PROCEDURE — 52000 CYSTOURETHROSCOPY: CPT

## 2020-09-04 RX ORDER — ESTRADIOL 0.1 MG/G
0.1 CREAM VAGINAL
Qty: 1 | Refills: 3 | Status: DISCONTINUED | COMMUNITY
Start: 2020-07-24 | End: 2020-09-04

## 2020-09-23 ENCOUNTER — APPOINTMENT (OUTPATIENT)
Dept: UROGYNECOLOGY | Facility: CLINIC | Age: 55
End: 2020-09-23

## 2020-09-30 RX ORDER — ESTRADIOL 0.1 MG/G
0.1 CREAM VAGINAL
Qty: 1 | Refills: 3 | Status: ACTIVE | COMMUNITY
Start: 2020-09-04 | End: 1900-01-01

## 2020-10-08 NOTE — ASU PREOP CHECKLIST - TEMPERATURE IN CELSIUS (DEGREES C)
Subjective:       Patient ID: Jane Lemus is a 48 y.o. female.    Chief Complaint: No chief complaint on file.    Flu shot given to left deltoid.   NKDA.   No food allergies.       ROS     Objective:      Physical Exam    Assessment:       No diagnosis found.    Plan:                   No follow-ups on file.      
35.6

## 2020-10-16 ENCOUNTER — APPOINTMENT (OUTPATIENT)
Dept: UROGYNECOLOGY | Facility: CLINIC | Age: 55
End: 2020-10-16
Payer: COMMERCIAL

## 2020-10-16 ENCOUNTER — OUTPATIENT (OUTPATIENT)
Dept: OUTPATIENT SERVICES | Facility: HOSPITAL | Age: 55
LOS: 1 days | Discharge: HOME | End: 2020-10-16

## 2020-10-16 VITALS — HEIGHT: 62 IN | SYSTOLIC BLOOD PRESSURE: 116 MMHG | DIASTOLIC BLOOD PRESSURE: 70 MMHG

## 2020-10-16 DIAGNOSIS — Z30.2 ENCOUNTER FOR STERILIZATION: Chronic | ICD-10-CM

## 2020-10-16 DIAGNOSIS — Z98.890 OTHER SPECIFIED POSTPROCEDURAL STATES: Chronic | ICD-10-CM

## 2020-10-16 DIAGNOSIS — N95.2 POSTMENOPAUSAL ATROPHIC VAGINITIS: ICD-10-CM

## 2020-10-16 DIAGNOSIS — Z12.11 ENCOUNTER FOR SCREENING FOR MALIGNANT NEOPLASM OF COLON: Chronic | ICD-10-CM

## 2020-10-16 PROCEDURE — 57160 INSERT PESSARY/OTHER DEVICE: CPT

## 2020-10-16 RX ORDER — TROSPIUM CHLORIDE 20 MG/1
20 TABLET, FILM COATED ORAL
Qty: 180 | Refills: 0 | Status: DISCONTINUED | COMMUNITY
Start: 2020-09-04 | End: 2020-10-16

## 2020-10-16 RX ORDER — SOLIFENACIN SUCCINATE 5 MG/1
5 TABLET ORAL
Qty: 30 | Refills: 1 | Status: ACTIVE | COMMUNITY
Start: 2020-10-16 | End: 1900-01-01

## 2020-10-16 NOTE — COUNSELING
[FreeTextEntry1] : If you feel like you have an infection it is important for you to call our office and we will arrange testing of your urine.\par \par Please begin taking Vesicare 5mg once a day. It takes up to 6 weeks to go into full effect. Please  your refill when you complete the 1st bottle.\par \par Please continue to apply a pea size amount to the opening of the vagina three times a week. \par \par Schedule a 6 weeks follow up med check appointment.\par Schedule a 3 weeks follow up for pessary placement. (ring and support with Knob #2)\par \par Please call my office if you have any issues with the cost or side effects of the medication. \par \par

## 2020-10-16 NOTE — DISCUSSION/SUMMARY
[FreeTextEntry1] : Urge/Mixed Incontinence-\par Prescribed Vesicare 5mg QD\par Precautions reviewed.\par Will return in 6 weeks for follow up or earlier if she has any issues.\par \par SALINA:\par Fitted ring and support with knob #2. Tolerated well. Remained in place with valsalva, walking, urinating.\par We do not have the pessary in the office. \par Pt will return in 3 weeks for pessary placement. Pt would like to learn to self maintain the pessary.

## 2020-10-16 NOTE — HISTORY OF PRESENT ILLNESS
[FreeTextEntry1] : Patient is here for 6 weeks med check for urge incontinence and pessary fitting for SALINA\par Last seen on 9/4/20 for cysto: normal  \par \par Operative report reviewed:\par 12/27/16 Dr Weaver: anterior colporrhaphy (but is not described in the procedure paragraph)/TVT/cysto with hydrodistention (tensioned with crede and full bladder)\par 1/17/17 Dr Weaver: revision of anterior colporrhaphy (but is not described in the procedure paragraph)/transection of sling,urethrolysis\par \par s/p tubal, s/p laparoscopic hysterectomy(endometriosis) 2015\par s/p sling, postop guerra for 3 weeks, Dr Miner, s/p ? transection, currently incontinence with coughing, sneezing, laughing, for 4 years, FOREST: S=U\par \par Myrbteriq 25mg: too expensive, never tried\par Trospium 20mg BID: \par \par Today pt states that she did not like the side effects of Trospium: nausea, abd pain, migraines, she stopped it. Would like to try a new bladder medication. \par \par

## 2020-11-04 ENCOUNTER — APPOINTMENT (OUTPATIENT)
Dept: UROGYNECOLOGY | Facility: CLINIC | Age: 55
End: 2020-11-04

## 2020-11-06 ENCOUNTER — OUTPATIENT (OUTPATIENT)
Dept: OUTPATIENT SERVICES | Facility: HOSPITAL | Age: 55
LOS: 1 days | Discharge: HOME | End: 2020-11-06

## 2020-11-06 ENCOUNTER — APPOINTMENT (OUTPATIENT)
Dept: UROGYNECOLOGY | Facility: CLINIC | Age: 55
End: 2020-11-06
Payer: COMMERCIAL

## 2020-11-06 VITALS — DIASTOLIC BLOOD PRESSURE: 84 MMHG | HEIGHT: 62 IN | SYSTOLIC BLOOD PRESSURE: 146 MMHG

## 2020-11-06 DIAGNOSIS — Z30.2 ENCOUNTER FOR STERILIZATION: Chronic | ICD-10-CM

## 2020-11-06 DIAGNOSIS — Z98.890 OTHER SPECIFIED POSTPROCEDURAL STATES: Chronic | ICD-10-CM

## 2020-11-06 DIAGNOSIS — Z12.11 ENCOUNTER FOR SCREENING FOR MALIGNANT NEOPLASM OF COLON: Chronic | ICD-10-CM

## 2020-11-06 PROCEDURE — 99213 OFFICE O/P EST LOW 20 MIN: CPT

## 2020-11-06 NOTE — COUNSELING
[FreeTextEntry1] : Please call the office if you have any issues with vaginal pain, vaginal bleeding, difficulty urinating or having a bowel movement or if the pessary falls out so that we can arrange another size pessary.\par \par Please follow up for pessary maintenance in 3 weeks with SEAN Delatorre\john

## 2020-11-06 NOTE — DISCUSSION/SUMMARY
[FreeTextEntry1] : \par Mixed Incontinence\par Ring and support with knob #2 was inserted without difficulty. Pessary remained in place with valsalva, coughing, and ambulating.\par The patient tolerated this well. \par Precautions given.\par The patient will follow up in 3 weeks for routine pessary management and self teaching.\par

## 2020-11-06 NOTE — HISTORY OF PRESENT ILLNESS
[FreeTextEntry1] : Patient is here for pessary fitting and placement for SALINA. \par Last seen on  10/16/20 for med check for urge incontinence. \par \par Operative report reviewed:\par 12/27/16 Dr Weaver: anterior colporrhaphy (but is not described in the procedure paragraph)/TVT/cysto with hydrodistention (tensioned with crede and full bladder)\par 1/17/17 Dr Weaver: revision of anterior colporrhaphy (but is not described in the procedure paragraph)/transection of sling,urethrolysis\par \par s/p tubal, s/p laparoscopic hysterectomy(endometriosis) 2015\par s/p sling, postop guerra for 3 weeks, Dr Miner, s/p ? transection, currently incontinence with coughing, sneezing, laughing, for 4 years, FOREST: S=U\par \par Myrbteriq 25mg: too expensive, never tried\par Trospium 20mg BID: nausea, migraines\par Vesicare: pt didn't try it,wad afraid of same side effects. \par \par Fitted: \par Ring and support with knob #2\par \par Patient is doing well and has no complaints today.\par

## 2020-11-09 ENCOUNTER — APPOINTMENT (OUTPATIENT)
Dept: UROGYNECOLOGY | Facility: CLINIC | Age: 55
End: 2020-11-09
Payer: COMMERCIAL

## 2020-11-09 VITALS
SYSTOLIC BLOOD PRESSURE: 145 MMHG | HEART RATE: 86 BPM | WEIGHT: 145 LBS | HEIGHT: 62 IN | DIASTOLIC BLOOD PRESSURE: 83 MMHG | BODY MASS INDEX: 26.68 KG/M2

## 2020-11-09 PROCEDURE — 99213 OFFICE O/P EST LOW 20 MIN: CPT

## 2020-11-09 PROCEDURE — 99072 ADDL SUPL MATRL&STAF TM PHE: CPT

## 2020-11-09 NOTE — HISTORY OF PRESENT ILLNESS
[FreeTextEntry1] : Patient is here because she felt the pessary at the introitus and c/o bloody discharge small amount x 2 days since the placement. Pt states that the pessary helped her with leakage significantly. \par Last seen 11/6/20 for pessary cleaning for SALINA, ring and support with knob #2.\par \par Operative report reviewed:\par 12/27/16 Dr Weaver: anterior colporrhaphy (but is not described in the procedure paragraph)/TVT/cysto with hydrodistention (tensioned with crede and full bladder)\par 1/17/17 Dr Weaver: revision of anterior colporrhaphy (but is not described in the procedure paragraph)/transection of sling,urethrolysis\par \par s/p tubal, s/p laparoscopic hysterectomy(endometriosis) 2015\par s/p sling, postop guerra for 3 weeks, Dr Miner, s/p ? transection, currently incontinence with coughing, sneezing, laughing, for 4 years, FOREST: S=U\par \par Myrbteriq 25mg: too expensive, never tried\par Trospium 20mg BID: nausea, migraines\par Vesicare: pt didn't try it,wad afraid of same side effects. \par \par Fitted: \par Ring and support with knob #2\par \par

## 2020-11-09 NOTE — COUNSELING
[FreeTextEntry1] : Please call the office if you have any issues with vaginal pain, vaginal bleeding, difficulty urinating or having a bowel movement or if the pessary falls out so that we can arrange another size pessary.\par \par Please follow up for your appt for pessary maintenance with SEAN Delatorre.\par

## 2020-11-09 NOTE — PHYSICAL EXAM
[Chaperone Present] : A chaperone was present in the examining room during all aspects of the physical examination [FreeTextEntry1] : Pessary was positioned well inside the vagina, under urethra. \par Speculum exam done: no discharge, no blood. \par \par  [No Acute Distress] : in no acute distress [Well developed] : well developed [Well Nourished] : ~L well nourished

## 2020-11-09 NOTE — DISCUSSION/SUMMARY
[FreeTextEntry1] : SALINA\par RIng and support with knob #2 removed, cleaned, inspected and reinserted. The patient tolerated this well. \par No bleeding, lesions, abnormal discharge were noted. \par The patient will follow up per her appt. \par \par

## 2020-11-25 ENCOUNTER — APPOINTMENT (OUTPATIENT)
Dept: GASTROENTEROLOGY | Facility: CLINIC | Age: 55
End: 2020-11-25

## 2020-12-28 ENCOUNTER — APPOINTMENT (OUTPATIENT)
Dept: UROGYNECOLOGY | Facility: CLINIC | Age: 55
End: 2020-12-28
Payer: COMMERCIAL

## 2020-12-28 VITALS
DIASTOLIC BLOOD PRESSURE: 76 MMHG | BODY MASS INDEX: 26.68 KG/M2 | HEIGHT: 62 IN | SYSTOLIC BLOOD PRESSURE: 134 MMHG | HEART RATE: 102 BPM | WEIGHT: 145 LBS

## 2020-12-28 PROCEDURE — 99072 ADDL SUPL MATRL&STAF TM PHE: CPT

## 2020-12-28 PROCEDURE — 99213 OFFICE O/P EST LOW 20 MIN: CPT

## 2020-12-28 NOTE — COUNSELING
[FreeTextEntry1] : Please call the office if you have any issues with vaginal pain, vaginal bleeding, difficulty urinating or having a bowel movement or if the pessary falls out so that we can arrange another size pessary.\par \par Please follow up for pessary maintenance in 3 months with SEAN Delatorre\par

## 2020-12-28 NOTE — HISTORY OF PRESENT ILLNESS
[FreeTextEntry1] : Patient is here for routine pessary cleaning for SALINA.\par Last seen on 11/9/20 for pessary check, ring and support with knob #2 \par \par Operative report reviewed:\par 12/27/16 Dr Weaver: anterior colporrhaphy (but is not described in the procedure paragraph)/TVT/cysto with hydrodistention (tensioned with crede and full bladder)\par 1/17/17 Dr Weaver: revision of anterior colporrhaphy (but is not described in the procedure paragraph)/transection of sling,urethrolysis\par \par s/p tubal, s/p laparoscopic hysterectomy(endometriosis) 2015\par s/p sling, postop guerra for 3 weeks, Dr Miner, s/p ? transection, currently incontinence with coughing, sneezing, laughing, for 4 years, FOREST: S=U\par \par Myrbteriq 25mg: too expensive, never tried\par Trospium 20mg BID: nausea, migraines\par Vesicare: pt didn't try it,wad afraid of same side effects. \par \par Fitted: \par Ring and support with knob #2\par \par Today, patient is doing well and has no complaints. She loves the pessary, is able to jump and work out in the gym and has no leaking. \par

## 2020-12-28 NOTE — DISCUSSION/SUMMARY
[FreeTextEntry1] : Mixed urinary incontinence\par Ring and support with knob #2 removed, cleaned, inspected and reinserted. The patient tolerated this well. \par No bleeding, lesions, abnormal discharge were noted. \par The patient will follow up in 3 months for routine pessary management.\par \par \par

## 2020-12-31 ENCOUNTER — APPOINTMENT (OUTPATIENT)
Dept: UROGYNECOLOGY | Facility: CLINIC | Age: 55
End: 2020-12-31

## 2021-03-19 ENCOUNTER — APPOINTMENT (OUTPATIENT)
Age: 56
End: 2021-03-19

## 2021-03-29 ENCOUNTER — APPOINTMENT (OUTPATIENT)
Dept: UROGYNECOLOGY | Facility: CLINIC | Age: 56
End: 2021-03-29

## 2021-04-22 ENCOUNTER — APPOINTMENT (OUTPATIENT)
Dept: UROGYNECOLOGY | Facility: CLINIC | Age: 56
End: 2021-04-22
Payer: COMMERCIAL

## 2021-04-22 VITALS
DIASTOLIC BLOOD PRESSURE: 71 MMHG | BODY MASS INDEX: 26.68 KG/M2 | WEIGHT: 145 LBS | SYSTOLIC BLOOD PRESSURE: 111 MMHG | HEIGHT: 62 IN | HEART RATE: 114 BPM

## 2021-04-22 PROCEDURE — 99213 OFFICE O/P EST LOW 20 MIN: CPT

## 2021-04-22 NOTE — HISTORY OF PRESENT ILLNESS
[FreeTextEntry1] : Patient is here for routine pessary cleaning for SALINA.\par Last seen 12/28/2020 for pessary cleaning, ring and support with knob # 2\par \par Operative report reviewed:\par 12/27/16 Dr Weaver: anterior colporrhaphy (but is not described in the procedure paragraph)/TVT/cysto with hydrodistention (tensioned with crede and full bladder)\par 1/17/17 Dr Weaver: revision of anterior colporrhaphy (but is not described in the procedure paragraph)/transection of sling,urethrolysis\par \par s/p tubal, s/p laparoscopic hysterectomy(endometriosis) 2015\par s/p sling, postop guerra for 3 weeks, Dr Miner, s/p ? transection, currently incontinence with coughing, sneezing, laughing, for 4 years, FOREST: S=U\par \par Myrbteriq 25mg: too expensive, never tried\par Trospium 20mg BID: nausea, migraines\par Vesicare: pt didn't try it,wad afraid of same side effects. \par \par Fitted: \par Ring and support with knob #2\par \par Today, patient is doing well and has no complaints. She is doing full body work outs w/o any leakage. She is sexually active and has no problems. \par

## 2021-04-22 NOTE — COUNSELING
[FreeTextEntry1] : Please call the office if you have any issues with vaginal pain, vaginal bleeding, difficulty urinating or having a bowel movement or if the pessary falls out so that we can arrange another size pessary.\par \par Please follow up for pessary maintenance in 3 months with SEAN Delatorre\par  “Last night was good, I talked to my wife and my girlfriend.”

## 2021-04-22 NOTE — DISCUSSION/SUMMARY
[FreeTextEntry1] : SALINA\par Ring and support with knob # 2 removed, cleaned, inspected and reinserted. The patient tolerated this well. \par No bleeding, lesions, abnormal discharge were noted. \par The patient will follow up in 3 months for routine pessary management.\par \par \par

## 2021-07-22 ENCOUNTER — APPOINTMENT (OUTPATIENT)
Dept: UROGYNECOLOGY | Facility: CLINIC | Age: 56
End: 2021-07-22

## 2021-10-04 ENCOUNTER — APPOINTMENT (OUTPATIENT)
Dept: UROGYNECOLOGY | Facility: CLINIC | Age: 56
End: 2021-10-04
Payer: COMMERCIAL

## 2021-10-04 VITALS
WEIGHT: 139 LBS | DIASTOLIC BLOOD PRESSURE: 73 MMHG | HEIGHT: 62 IN | HEART RATE: 78 BPM | SYSTOLIC BLOOD PRESSURE: 123 MMHG | BODY MASS INDEX: 25.58 KG/M2

## 2021-10-04 PROCEDURE — 99214 OFFICE O/P EST MOD 30 MIN: CPT

## 2021-10-04 NOTE — DISCUSSION/SUMMARY
[FreeTextEntry1] : Mixed Incontinence\par Ring and support with knob # 2 removed, cleaned, inspected and reinserted. The patient tolerated this well. \par No bleeding, lesions, abnormal discharge were noted. \par Pt was taught how to remove and reinsert the pessary herself. She feels comfortable doing it on her own\par The patient will follow up in 3 months for routine pessary management.\par

## 2021-10-04 NOTE — COUNSELING
[FreeTextEntry1] : Please call the office if you have any issues with vaginal pain, vaginal bleeding, difficulty urinating or having a bowel movement or if the pessary falls out so that we can arrange another size pessary.\par \par Please self maintain (clean with soap and water) the pessary once a month or every 2-3 months. \par \par Please follow up for pessary maintenance in 3 months with SEAN Delatorre\par

## 2021-10-04 NOTE — HISTORY OF PRESENT ILLNESS
[FreeTextEntry1] : Patient is here for routine pessary cleaning for SALINA. \par Last seen 4/22/21 for pessary cleaning. ring and support with knob # 2\par \par Operative report reviewed:\par 12/27/16 Dr Weaver: anterior colporrhaphy (but is not described in the procedure paragraph)/TVT/cysto with hydrodistention (tensioned with crede and full bladder)\par 1/17/17 Dr Weaver: revision of anterior colporrhaphy (but is not described in the procedure paragraph)/transection of sling,urethrolysis\par \par s/p tubal, s/p laparoscopic hysterectomy(endometriosis) 2015\par s/p sling, postop guerra for 3 weeks, Dr Miner, s/p ? transection, currently incontinence with coughing, sneezing, laughing, for 4 years, FOREST: S=U\par \par Myrbetriq 25mg: too expensive, never tried\par Trospium 20mg BID: nausea, migraines\par Vesicare: pt didn't try it,wad afraid of same side effects. \par \par Fitted: \par Ring and support with knob #2\par \par Sexually active\par \par Today, patient is doing well and has no complaints. Wants to learn how to self maintain the pessary on her own. Has been doing well with the pessary and no leakage, just once a few weeks ago. \par

## 2021-12-26 ENCOUNTER — TRANSCRIPTION ENCOUNTER (OUTPATIENT)
Age: 56
End: 2021-12-26

## 2022-01-18 ENCOUNTER — TRANSCRIPTION ENCOUNTER (OUTPATIENT)
Age: 57
End: 2022-01-18

## 2022-01-24 ENCOUNTER — APPOINTMENT (OUTPATIENT)
Dept: UROGYNECOLOGY | Facility: CLINIC | Age: 57
End: 2022-01-24
Payer: COMMERCIAL

## 2022-01-24 VITALS
HEART RATE: 96 BPM | DIASTOLIC BLOOD PRESSURE: 79 MMHG | BODY MASS INDEX: 24.66 KG/M2 | WEIGHT: 134 LBS | HEIGHT: 62 IN | SYSTOLIC BLOOD PRESSURE: 150 MMHG

## 2022-01-24 PROCEDURE — 99213 OFFICE O/P EST LOW 20 MIN: CPT

## 2022-01-24 NOTE — DISCUSSION/SUMMARY
[FreeTextEntry1] : Mixed Incontinence:\par Ring and support with knob #2 removed, cleaned, inspected and reinserted. The patient tolerated this well. \par No bleeding, lesions, abnormal discharge were noted. \par Will check if pt is using estrogen cream at next visit\par The patient will follow up in 6 months for routine pessary management.\par \par

## 2022-01-24 NOTE — COUNSELING
[FreeTextEntry1] : Please call the office if you have any issues with vaginal pain, vaginal bleeding, difficulty urinating or having a bowel movement or if the pessary falls out so that we can arrange another size pessary.\par \par Continue to clean the pessary once a month. \par \par Please follow up for pessary maintenance in 6 months with PA \par \par

## 2022-01-24 NOTE — HISTORY OF PRESENT ILLNESS
[FreeTextEntry1] : Patient is here for routine pessary cleaning for SALINA\par Last seen 10/4/21 for pessary cleaning. ring and support with knob #2\par \par Operative report reviewed:\par 12/27/16 Dr Weaver: anterior colporrhaphy (but is not described in the procedure paragraph)/TVT/cysto with hydrodistention (tensioned with crede and full bladder)\par 1/17/17 Dr Weaver: revision of anterior colporrhaphy (but is not described in the procedure paragraph)/transection of sling,urethrolysis\par \par s/p tubal, s/p laparoscopic hysterectomy(endometriosis) 2015\par s/p sling, postop guerra for 3 weeks, Dr Miner, s/p ? transection, currently incontinence with coughing, sneezing, laughing, for 4 years, FOREST: S=U\par \par Myrbetriq 25mg: too expensive, never tried\par Trospium 20mg BID: nausea, migraines\par Vesicare: pt didn't try it,wad afraid of same side effects. \par \par Fitted: \par Ring and support with knob #2\par \par Sexually active\par \par Today, patient is doing well and has no complaints. Has been self maintaining the pessary on her own once a month. Sexually active w/o any problems. Adjusts the pessary occasionally. Denies any leakage. \par \par \par \par \par \par No/History of Incomplete Bladder Emptying without reduction. PVR:\par \par Today, patient is doing well and has no complaints.\par

## 2022-03-20 ENCOUNTER — TRANSCRIPTION ENCOUNTER (OUTPATIENT)
Age: 57
End: 2022-03-20

## 2022-07-12 ENCOUNTER — APPOINTMENT (OUTPATIENT)
Dept: UROGYNECOLOGY | Facility: CLINIC | Age: 57
End: 2022-07-12

## 2022-07-12 VITALS
SYSTOLIC BLOOD PRESSURE: 148 MMHG | WEIGHT: 135 LBS | HEART RATE: 87 BPM | DIASTOLIC BLOOD PRESSURE: 87 MMHG | BODY MASS INDEX: 24.84 KG/M2 | HEIGHT: 62 IN

## 2022-07-12 DIAGNOSIS — N39.46 MIXED INCONTINENCE: ICD-10-CM

## 2022-07-12 PROCEDURE — 57160 INSERT PESSARY/OTHER DEVICE: CPT

## 2022-07-12 PROCEDURE — A4562: CPT

## 2022-07-12 PROCEDURE — 99214 OFFICE O/P EST MOD 30 MIN: CPT | Mod: 25

## 2022-07-12 NOTE — HISTORY OF PRESENT ILLNESS
[FreeTextEntry1] : Patient is here for pessary check and cleaning for SALINA.\par Last seen 1/24/22 for pessary cleaning. ring and support with knob # 2\par \par Self maintains once a month\par \par Operative report reviewed:\par 12/27/16 Dr Weaver: anterior colporrhaphy (but is not described in the procedure paragraph)/TVT/cysto with hydrodistention (tensioned with crede and full bladder)\par 1/17/17 Dr Weaver: revision of anterior colporrhaphy (but is not described in the procedure paragraph)/transection of sling,urethrolysis\par \par s/p tubal, s/p laparoscopic hysterectomy(endometriosis) 2015\par s/p sling, postop guerra for 3 weeks, Dr Miner, s/p ? transection, currently incontinence with coughing, sneezing, laughing, for 4 years, FOREST: S=U\par \par Myrbetriq 25mg: too expensive, never tried\par Trospium 20mg BID: nausea, migraines\par Vesicare: pt didn't try it,wad afraid of same side effects. \par \par Fitted: \par Ring and support with knob #2\par \par Sexually active\par \par \par Today, patient is doing well. States that the pessary is moving inside now more than before. She usually has to move the knob to the center. Sexually active with the pessary inside and sometimes uncomfortable. Continues to be very physically active and the pessary helps to prevent leakage of urine. Would like to try a larger pessary. \par

## 2022-07-12 NOTE — DISCUSSION/SUMMARY
[FreeTextEntry1] : Mixed Incontinence\par Ring and support with knob #2 removed, cleaned, inspected and NOT reinserted. The patient tolerated this well. \par No bleeding, lesions, abnormal discharge were noted. \par \par Fitting performed:\par New pessary, Ring and support with knob # 3 placed without difficulty. Remained in place with Valsalva, coughing, and ambulating. \par The patient tolerated all fittings well.\par \par Advised to remove the pessary for intercourse.\par If unable to self maintain advised to return in 3 months for cleaning. \par \par The patient will follow up in 6 months for routine pessary management.\par

## 2022-07-12 NOTE — COUNSELING
[FreeTextEntry1] : Please call the office if you have any issues with vaginal pain, vaginal bleeding, difficulty urinating or having a bowel movement or if the pessary falls out so that we can arrange another size pessary.\par \par Please continue to clean the pessary once a month. \par \par Please follow up for pessary maintenance in 6 months with SEAN Delatorre\par

## 2023-03-20 ENCOUNTER — NON-APPOINTMENT (OUTPATIENT)
Age: 58
End: 2023-03-20

## 2023-03-21 ENCOUNTER — APPOINTMENT (OUTPATIENT)
Dept: UROGYNECOLOGY | Facility: CLINIC | Age: 58
End: 2023-03-21

## 2023-08-04 NOTE — BRIEF OPERATIVE NOTE - PRE-OP DX
Bunion of right foot  04/27/2018    Active  Virgie Flores  Bunionette of right foot  04/27/2018    Active  Virgie Flores  Hallux interphalangeus, acquired, right  04/27/2018  primus elevatus , medially deviated  Active  Virgie Flores
(Respiratory Rate) 4= 10-29

## 2024-10-26 ENCOUNTER — OUTPATIENT (OUTPATIENT)
Dept: OUTPATIENT SERVICES | Facility: HOSPITAL | Age: 59
LOS: 1 days | End: 2024-10-26
Payer: COMMERCIAL

## 2024-10-26 DIAGNOSIS — Z00.8 ENCOUNTER FOR OTHER GENERAL EXAMINATION: ICD-10-CM

## 2024-10-26 DIAGNOSIS — I25.10 ATHEROSCLEROTIC HEART DISEASE OF NATIVE CORONARY ARTERY WITHOUT ANGINA PECTORIS: ICD-10-CM

## 2024-10-26 DIAGNOSIS — Z12.11 ENCOUNTER FOR SCREENING FOR MALIGNANT NEOPLASM OF COLON: Chronic | ICD-10-CM

## 2024-10-26 DIAGNOSIS — Z98.890 OTHER SPECIFIED POSTPROCEDURAL STATES: Chronic | ICD-10-CM

## 2024-10-26 DIAGNOSIS — Z30.2 ENCOUNTER FOR STERILIZATION: Chronic | ICD-10-CM

## 2024-10-26 PROCEDURE — 75571 CT HRT W/O DYE W/CA TEST: CPT | Mod: 26

## 2024-10-26 PROCEDURE — 75571 CT HRT W/O DYE W/CA TEST: CPT

## 2024-10-27 DIAGNOSIS — I25.10 ATHEROSCLEROTIC HEART DISEASE OF NATIVE CORONARY ARTERY WITHOUT ANGINA PECTORIS: ICD-10-CM

## 2024-12-11 NOTE — ASU PREOP CHECKLIST - SURGICAL CONSENT
[facility]:    Patient was referred to the [facility] by Dr Campbell for NSCLC.  Team members present for discussion include:   Dr Abran Antonio, NP  Marisa Rodriguez, NP  Venus Gambino, NP  Dominick Jefferson, MSW  Hawk Ceja and Robert Alvarado, Research Coordinators   Meche Alexandre, Research RN  Nicol Neri, RN  Meg Frost, EWELINA Williamson, EWELINA, CNN     NCCN guidelines were reviewed with team.  Recommended treatment is for patient to proceed with surgery consult. See MD notes for accurate and up to date treatment recommendations.     Patient's case was discussion only today. Patient was called and given treatment recommendations and is in agreement to consult with CT Surgery to discussion surgical resection.       LOS:  SURGERY CONSULT- referral has been placed.   
done

## 2025-07-14 NOTE — H&P PST ADULT - PRIMARY CARE PROVIDER
dr combs Detail Level: Detailed Add 42499 Cpt? (Important Note: In 2017 The Use Of 91294 Is Being Tracked By Cms To Determine Future Global Period Reimbursement For Global Periods): no Wound Diameter In Cm(Optional): 0 Wound Crusting?: crusted Sutures?: intact Wound Edema?: mild Wound Color?: pink Wound Bruising?: moderate Wound Crusting?: clean dr combs within last few weeks, pt to call